# Patient Record
Sex: FEMALE | Race: BLACK OR AFRICAN AMERICAN | NOT HISPANIC OR LATINO | Employment: FULL TIME | URBAN - METROPOLITAN AREA
[De-identification: names, ages, dates, MRNs, and addresses within clinical notes are randomized per-mention and may not be internally consistent; named-entity substitution may affect disease eponyms.]

---

## 2023-03-24 LAB
IRON (UG/DL) IN SER/PLAS: 26 UG/DL (ref 35–150)
THYROTROPIN (MIU/L) IN SER/PLAS BY DETECTION LIMIT <= 0.05 MIU/L: 1.57 MIU/L (ref 0.44–3.98)

## 2023-10-25 DIAGNOSIS — Z02.0 SCHOOL PHYSICAL EXAM: Primary | ICD-10-CM

## 2024-02-28 ENCOUNTER — OFFICE VISIT (OUTPATIENT)
Dept: DERMATOLOGY | Facility: CLINIC | Age: 24
End: 2024-02-28
Payer: COMMERCIAL

## 2024-02-28 DIAGNOSIS — L59.0 ERYTHEMA AB IGNE: ICD-10-CM

## 2024-02-28 DIAGNOSIS — L21.9 SEBORRHEIC DERMATITIS: Primary | ICD-10-CM

## 2024-02-28 DIAGNOSIS — L70.0 ACNE VULGARIS: ICD-10-CM

## 2024-02-28 PROCEDURE — 99204 OFFICE O/P NEW MOD 45 MIN: CPT | Performed by: DERMATOLOGY

## 2024-02-28 RX ORDER — KETOCONAZOLE 20 MG/ML
SHAMPOO, SUSPENSION TOPICAL 3 TIMES WEEKLY
Qty: 120 ML | Refills: 11 | Status: SHIPPED | OUTPATIENT
Start: 2024-02-28

## 2024-02-28 RX ORDER — KETOCONAZOLE 20 MG/ML
SHAMPOO, SUSPENSION TOPICAL
COMMUNITY
Start: 2023-09-27

## 2024-02-28 RX ORDER — BENZOYL PEROXIDE 50 MG/ML
1 LIQUID TOPICAL DAILY
Qty: 227 G | Refills: 11 | Status: SHIPPED | OUTPATIENT
Start: 2024-02-28

## 2024-02-28 RX ORDER — FLUOCINOLONE ACETONIDE 0.11 MG/ML
OIL TOPICAL
Qty: 120 ML | Refills: 2 | Status: SHIPPED | OUTPATIENT
Start: 2024-02-28

## 2024-02-28 RX ORDER — CLINDAMYCIN PHOSPHATE 10 UG/ML
LOTION TOPICAL 2 TIMES DAILY
Qty: 60 ML | Refills: 11 | Status: SHIPPED | OUTPATIENT
Start: 2024-02-28 | End: 2025-02-27

## 2024-02-28 ASSESSMENT — DERMATOLOGY PATIENT ASSESSMENT
ARE YOU TRYING TO GET PREGNANT: NO
ARE YOU ON BIRTH CONTROL: NO
DO YOU USE A TANNING BED: NO
DO YOU USE SUNSCREEN: OCCASIONALLY
DO YOU HAVE ANY NEW OR CHANGING LESIONS: NO
DO YOU HAVE IRREGULAR MENSTRUAL CYCLES: NO

## 2024-02-28 ASSESSMENT — DERMATOLOGY QUALITY OF LIFE (QOL) ASSESSMENT: ARE THERE EXCLUSIONS OR EXCEPTIONS FOR THE QUALITY OF LIFE ASSESSMENT: NO

## 2024-02-28 NOTE — PROGRESS NOTES
Subjective     Jennifer Hdez is a 23 y.o. female who presents for the following: Hair/Scalp Problem (Dry, flaky scalp).  The patient states she has been experiencing a dry, flaky scalp for the past 2 years.  She states she recently saw an outside Dermatologist, who prescribed ketoconazole shampoo and performed IL steroid injections in her scalp, which she reports helped, but her symptoms have recurred.  She also notes recent breakouts on her face.  Lastly, she reports discolored areas on her ankles and lower legs.  Of note, she states she uses a space heater on the floor in front of her legs at work.      Review of Systems:  No other skin or systemic complaints other than what is documented elsewhere in the note.    The following portions of the chart were reviewed this encounter and updated as appropriate:       Skin Cancer History  No skin cancer on file.    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    Current Medications / CAM's:    Current Outpatient Medications:     ketoconazole (NIZOral) 2 % shampoo, USE AS DIRECTED EVERY OTHER WEEK, Disp: , Rfl:     benzoyl peroxide 5 % external wash, Apply 1 Application topically once daily. In the morning, Disp: 227 g, Rfl: 11    clindamycin (Cleocin T) 1 % lotion, Apply topically 2 times a day., Disp: 60 mL, Rfl: 11    fluocinolone (Derma-Smoothe) 0.01 % external oil, Apply twice daily to affected areas of scalp (avoid face, groin, body folds) for 2 weeks, taper to weekends only; repeat every 6-8 weeks as needed for flares, Disp: 120 mL, Rfl: 2    ketoconazole (NIZOral) 2 % shampoo, Apply topically 3 times a week., Disp: 120 mL, Rfl: 11     Objective   Well appearing  patient in no apparent distress; mood and affect are within normal limits.    A skin examination was performed including the face and neck. All findings within normal limits unless otherwise noted below.    Assessment/Plan   1. Seborrheic dermatitis  Scalp  On the patient's scalp, there are pink to slightly erythematous, scaly patches with whitish-yellowish, greasy scale    Seborrheic Dermatitis - scalp.  The potentially chronic and intermittently flaring nature of this condition and treatment options were discussed extensively with the patient today.  At this time, I recommend combination topical therapy, including topical anti-fungal therapy with Ketoconazole 2% shampoo, which the patient was instructed to use 2-3 days per week or at least once weekly, alternating with over-the-counter anti-dandruff shampoos, such as Head & Shoulders, Selsun Blue, and Neutrogena T-gel, every month as well as topical steroid therapy with Fluocinolone 0.01% solution, which the patient was instructed to apply twice daily to the affected areas of the scalp for the next 2 weeks, followed by taper to twice daily on weekends only for persistent areas and/or maintenance; the patient may repeat treatment in a 2-week burst-and-taper fashion every 6-8 weeks as needed for future flares.  The risks, benefits, and side effects of these medications, including possible skin atrophy with overuse of topical steroids, were discussed.  The patient expressed understanding and is in agreement with this plan.    ketoconazole (NIZOral) 2 % shampoo - Scalp  Apply topically 3 times a week.    fluocinolone (Derma-Smoothe) 0.01 % external oil - Scalp  Apply twice daily to affected areas of scalp (avoid face, groin, body folds) for 2 weeks, taper to weekends only; repeat every 6-8 weeks as needed for flares    2. Acne vulgaris  Head - Anterior (Face)  Scattered on the patient's face, there are several open and closed comedones; a few erythematous,  inflammatory papules; and several hyperpigmented macules consistent with post-inflammatory hyperpigmentation    Acne Vulgaris -face; mild; mixed comedonal and inflammatory types.  The nature of this condition and treatment options were discussed extensively with the patient today.  At this time, I recommend combination topical therapy with Benzoyl Peroxide 5% creamy wash once or twice daily and Clindamycin 1% lotion twice daily or up to 3-4 times per day as needed for active lesions.  The risks, benefits, and side effects of these medications, including the redness, dryness, and irritation expected with BP use, were discussed.  I also informed the patient it will likely take at least 2-3 months to notice any significant improvement with the treatment regimen outlined above, and she was instructed to discontinue use of the BP wash should she become or attempt to become pregnant at any time in the future.  She expressed understanding and is in agreement with this plan.    benzoyl peroxide 5 % external wash - Head - Anterior (Face)  Apply 1 Application topically once daily. In the morning    clindamycin (Cleocin T) 1 % lotion - Head - Anterior (Face)  Apply topically 2 times a day.    3. Erythema ab igne  Left Lower Leg - Anterior  On her bilateral anterior distal legs and anterior ankles, there are reddish purpleish and hyperpigmented, reticulated patches    Erythema ab igne - bilateral anterior distal legs and anterior ankles.  The benign nature of this condition, which the patient was informed can involve development of squamous cell carcinomas, which is a malignant type of skin cancer, if the condition persists and management options were discussed with the patient in the office today and reassurance provided.  At this time, I recommend she discontinue use of the external space heater she has been using near her legs at work, as this is likely resulting in these lesions.  She expressed understanding and is in  agreement with this plan.

## 2024-09-22 ENCOUNTER — CLINICAL SUPPORT (OUTPATIENT)
Dept: URGENT CARE | Age: 24
End: 2024-09-22
Payer: COMMERCIAL

## 2024-09-22 VITALS
WEIGHT: 116.84 LBS | TEMPERATURE: 98.4 F | RESPIRATION RATE: 16 BRPM | SYSTOLIC BLOOD PRESSURE: 91 MMHG | DIASTOLIC BLOOD PRESSURE: 61 MMHG | HEART RATE: 76 BPM | OXYGEN SATURATION: 98 % | HEIGHT: 60 IN | BODY MASS INDEX: 22.94 KG/M2

## 2024-09-22 DIAGNOSIS — Z11.1 TUBERCULIN SKIN TEST ENCOUNTER: Primary | ICD-10-CM

## 2024-09-24 ENCOUNTER — CLINICAL SUPPORT (OUTPATIENT)
Dept: URGENT CARE | Age: 24
End: 2024-09-24

## 2024-09-24 LAB
INDURATION: NORMAL MM
TB SKIN TEST: NEGATIVE

## 2024-09-26 ENCOUNTER — TELEPHONE (OUTPATIENT)
Dept: URGENT CARE | Age: 24
End: 2024-09-26

## 2024-09-28 NOTE — TELEPHONE ENCOUNTER
Patient was informed that I would scan the signed tb form for her to  at the [place] location during her physical

## 2024-10-11 ENCOUNTER — APPOINTMENT (OUTPATIENT)
Dept: PRIMARY CARE | Facility: CLINIC | Age: 24
End: 2024-10-11
Payer: COMMERCIAL